# Patient Record
Sex: FEMALE | Race: BLACK OR AFRICAN AMERICAN | NOT HISPANIC OR LATINO | Employment: UNEMPLOYED | URBAN - METROPOLITAN AREA
[De-identification: names, ages, dates, MRNs, and addresses within clinical notes are randomized per-mention and may not be internally consistent; named-entity substitution may affect disease eponyms.]

---

## 2017-01-23 ENCOUNTER — HOSPITAL ENCOUNTER (EMERGENCY)
Facility: HOSPITAL | Age: 22
Discharge: HOME | End: 2017-01-23
Attending: EMERGENCY MEDICINE
Payer: COMMERCIAL

## 2017-01-23 VITALS
OXYGEN SATURATION: 100 % | WEIGHT: 114 LBS | RESPIRATION RATE: 17 BRPM | HEART RATE: 86 BPM | TEMPERATURE: 97.9 F | DIASTOLIC BLOOD PRESSURE: 65 MMHG | SYSTOLIC BLOOD PRESSURE: 110 MMHG

## 2017-01-23 DIAGNOSIS — R11.2 NAUSEA & VOMITING: ICD-10-CM

## 2017-01-23 DIAGNOSIS — Z3A.01 LESS THAN 8 WEEKS GESTATION OF PREGNANCY: Primary | ICD-10-CM

## 2017-01-23 LAB
ALBUMIN SERPL BCP-MCNC: 3.4 G/DL (ref 3.5–5)
ALP SERPL-CCNC: 59 U/L (ref 46–116)
ALT SERPL W P-5'-P-CCNC: 20 U/L (ref 12–78)
ANION GAP SERPL CALCULATED.3IONS-SCNC: 9 MMOL/L (ref 4–13)
AST SERPL W P-5'-P-CCNC: 15 U/L (ref 5–45)
BACTERIA UR QL AUTO: ABNORMAL /HPF
BASOPHILS # BLD AUTO: 0.01 THOUSANDS/ΜL (ref 0–0.1)
BASOPHILS NFR BLD AUTO: 0 % (ref 0–1)
BILIRUB SERPL-MCNC: 0.4 MG/DL (ref 0.2–1)
BILIRUB UR QL STRIP: NEGATIVE
BUN SERPL-MCNC: 6 MG/DL (ref 5–25)
CALCIUM SERPL-MCNC: 8.7 MG/DL (ref 8.3–10.1)
CHLORIDE SERPL-SCNC: 103 MMOL/L (ref 100–108)
CLARITY UR: CLEAR
CO2 SERPL-SCNC: 24 MMOL/L (ref 21–32)
COLOR UR: YELLOW
CREAT SERPL-MCNC: 0.56 MG/DL (ref 0.6–1.3)
EOSINOPHIL # BLD AUTO: 0.01 THOUSAND/ΜL (ref 0–0.61)
EOSINOPHIL NFR BLD AUTO: 0 % (ref 0–6)
ERYTHROCYTE [DISTWIDTH] IN BLOOD BY AUTOMATED COUNT: 13.3 % (ref 11.6–15.1)
GFR SERPL CREATININE-BSD FRML MDRD: >60 ML/MIN/1.73SQ M
GLUCOSE SERPL-MCNC: 85 MG/DL (ref 65–140)
GLUCOSE UR STRIP-MCNC: NEGATIVE MG/DL
HCG UR QL: POSITIVE
HCT VFR BLD AUTO: 34.5 % (ref 34.8–46.1)
HGB BLD-MCNC: 11.5 G/DL (ref 11.5–15.4)
HGB UR QL STRIP.AUTO: NEGATIVE
KETONES UR STRIP-MCNC: NEGATIVE MG/DL
LEUKOCYTE ESTERASE UR QL STRIP: ABNORMAL
LIPASE SERPL-CCNC: 76 U/L (ref 73–393)
LYMPHOCYTES # BLD AUTO: 0.59 THOUSANDS/ΜL (ref 0.6–4.47)
LYMPHOCYTES NFR BLD AUTO: 10 % (ref 14–44)
MAGNESIUM SERPL-MCNC: 1.7 MG/DL (ref 1.6–2.6)
MCH RBC QN AUTO: 26.8 PG (ref 26.8–34.3)
MCHC RBC AUTO-ENTMCNC: 33.3 G/DL (ref 31.4–37.4)
MCV RBC AUTO: 80 FL (ref 82–98)
MONOCYTES # BLD AUTO: 0.47 THOUSAND/ΜL (ref 0.17–1.22)
MONOCYTES NFR BLD AUTO: 8 % (ref 4–12)
MUCOUS THREADS UR QL AUTO: ABNORMAL
NEUTROPHILS # BLD AUTO: 4.9 THOUSANDS/ΜL (ref 1.85–7.62)
NEUTS SEG NFR BLD AUTO: 82 % (ref 43–75)
NITRITE UR QL STRIP: NEGATIVE
NON-SQ EPI CELLS URNS QL MICRO: ABNORMAL /HPF
PH UR STRIP.AUTO: 8.5 [PH] (ref 4.5–8)
PLATELET # BLD AUTO: 192 THOUSANDS/UL (ref 149–390)
PMV BLD AUTO: 11.1 FL (ref 8.9–12.7)
POTASSIUM SERPL-SCNC: 3.6 MMOL/L (ref 3.5–5.3)
PROT SERPL-MCNC: 7.5 G/DL (ref 6.4–8.2)
PROT UR STRIP-MCNC: ABNORMAL MG/DL
RBC # BLD AUTO: 4.29 MILLION/UL (ref 3.81–5.12)
RBC #/AREA URNS AUTO: ABNORMAL /HPF
SODIUM SERPL-SCNC: 136 MMOL/L (ref 136–145)
SP GR UR STRIP.AUTO: 1.02 (ref 1–1.03)
UROBILINOGEN UR QL STRIP.AUTO: 2 E.U./DL
WBC # BLD AUTO: 5.98 THOUSAND/UL (ref 4.31–10.16)
WBC #/AREA URNS AUTO: ABNORMAL /HPF

## 2017-01-23 PROCEDURE — 81025 URINE PREGNANCY TEST: CPT | Performed by: EMERGENCY MEDICINE

## 2017-01-23 PROCEDURE — 96374 THER/PROPH/DIAG INJ IV PUSH: CPT

## 2017-01-23 PROCEDURE — 80053 COMPREHEN METABOLIC PANEL: CPT | Performed by: EMERGENCY MEDICINE

## 2017-01-23 PROCEDURE — 96361 HYDRATE IV INFUSION ADD-ON: CPT

## 2017-01-23 PROCEDURE — 36415 COLL VENOUS BLD VENIPUNCTURE: CPT | Performed by: EMERGENCY MEDICINE

## 2017-01-23 PROCEDURE — 99283 EMERGENCY DEPT VISIT LOW MDM: CPT

## 2017-01-23 PROCEDURE — 83735 ASSAY OF MAGNESIUM: CPT | Performed by: EMERGENCY MEDICINE

## 2017-01-23 PROCEDURE — 87186 SC STD MICRODIL/AGAR DIL: CPT

## 2017-01-23 PROCEDURE — 87086 URINE CULTURE/COLONY COUNT: CPT

## 2017-01-23 PROCEDURE — 85025 COMPLETE CBC W/AUTO DIFF WBC: CPT | Performed by: EMERGENCY MEDICINE

## 2017-01-23 PROCEDURE — 81001 URINALYSIS AUTO W/SCOPE: CPT

## 2017-01-23 PROCEDURE — 87147 CULTURE TYPE IMMUNOLOGIC: CPT

## 2017-01-23 PROCEDURE — 83690 ASSAY OF LIPASE: CPT | Performed by: EMERGENCY MEDICINE

## 2017-01-23 PROCEDURE — 81002 URINALYSIS NONAUTO W/O SCOPE: CPT | Performed by: EMERGENCY MEDICINE

## 2017-01-23 RX ORDER — ONDANSETRON 2 MG/ML
4 INJECTION INTRAMUSCULAR; INTRAVENOUS ONCE
Status: COMPLETED | OUTPATIENT
Start: 2017-01-23 | End: 2017-01-23

## 2017-01-23 RX ORDER — FAMOTIDINE 20 MG
1 TABLET ORAL DAILY
Qty: 30 EACH | Refills: 0 | Status: SHIPPED | OUTPATIENT
Start: 2017-01-23 | End: 2020-03-01

## 2017-01-23 RX ORDER — ONDANSETRON 4 MG/1
4 TABLET, ORALLY DISINTEGRATING ORAL EVERY 8 HOURS PRN
Qty: 20 TABLET | Refills: 0 | Status: SHIPPED | OUTPATIENT
Start: 2017-01-23 | End: 2020-03-01

## 2017-01-23 RX ADMIN — ONDANSETRON 4 MG: 2 INJECTION INTRAMUSCULAR; INTRAVENOUS at 15:13

## 2017-01-23 RX ADMIN — SODIUM CHLORIDE 1000 ML: 0.9 INJECTION, SOLUTION INTRAVENOUS at 15:13

## 2017-01-26 ENCOUNTER — TELEPHONE (OUTPATIENT)
Dept: EMERGENCY DEPT | Facility: HOSPITAL | Age: 22
End: 2017-01-26

## 2017-01-26 LAB
BACTERIA UR CULT: NORMAL
BACTERIA UR CULT: NORMAL

## 2017-01-26 RX ORDER — CEPHALEXIN 500 MG/1
500 CAPSULE ORAL 3 TIMES DAILY
Qty: 9 CAPSULE | Refills: 0 | Status: SHIPPED | OUTPATIENT
Start: 2017-01-26 | End: 2017-01-29

## 2017-07-12 ENCOUNTER — GENERIC CONVERSION - ENCOUNTER (OUTPATIENT)
Dept: OTHER | Facility: OTHER | Age: 22
End: 2017-07-12

## 2017-07-13 ENCOUNTER — GENERIC CONVERSION - ENCOUNTER (OUTPATIENT)
Dept: OTHER | Facility: OTHER | Age: 22
End: 2017-07-13

## 2017-07-13 ENCOUNTER — ALLSCRIPTS OFFICE VISIT (OUTPATIENT)
Dept: PERINATAL CARE | Facility: CLINIC | Age: 22
End: 2017-07-13

## 2017-07-13 PROCEDURE — 76811 OB US DETAILED SNGL FETUS: CPT | Performed by: OBSTETRICS & GYNECOLOGY

## 2017-07-24 ENCOUNTER — GENERIC CONVERSION - ENCOUNTER (OUTPATIENT)
Dept: OTHER | Facility: OTHER | Age: 22
End: 2017-07-24

## 2017-08-23 ENCOUNTER — GENERIC CONVERSION - ENCOUNTER (OUTPATIENT)
Dept: OTHER | Facility: OTHER | Age: 22
End: 2017-08-23

## 2017-08-23 ENCOUNTER — ALLSCRIPTS OFFICE VISIT (OUTPATIENT)
Dept: PERINATAL CARE | Facility: OTHER | Age: 22
End: 2017-08-23

## 2017-08-23 PROCEDURE — 76816 OB US FOLLOW-UP PER FETUS: CPT | Performed by: OBSTETRICS & GYNECOLOGY

## 2018-01-12 VITALS
SYSTOLIC BLOOD PRESSURE: 131 MMHG | HEIGHT: 62 IN | DIASTOLIC BLOOD PRESSURE: 71 MMHG | BODY MASS INDEX: 23.37 KG/M2 | WEIGHT: 127 LBS

## 2018-01-16 NOTE — PROGRESS NOTES
AUG 23 2017         RE: Sudha Desai                             To: ShilpaQUINTON Carvajal    MR#: [id number]                                   [address]   : [address], [address]   SS#:                                              Fax: [phone]   ENC: [id number]   (Exam #: [id number])      The LMP of this 22year old,  G1, P0-0-0-0 patient was unknown, her   working KAILEY is AUG 27 2017 and the current gestational age is 43 weeks 0   days by 2rd Trimester Sono  A sonographic examination was performed on AUG   23 2017 using real time equipment  The ultrasound examination was   performed using abdominal technique  The patient has a BMI of 24 0  Her   blood pressure today was 119/77  Earliest ultrasound found in her record: MFM 17  33w1d  KAILEY 17      Cardiac motion was observed at 138 bpm       INDICATIONS      unsure of dates   late prenatal care   fetal growth   Evaluate missed anatomy      Exam Types      Level I      RESULTS      Fetus # 1 of 1   Vertex presentation   Fetal growth appeared normal   Placenta Location = Posterior   No placenta previa   Placenta Grade = II      MEASUREMENTS (* Included In Average GA)      AC              32 7 cm        36 weeks 6 days* (22%)   BPD              8 9 cm        36 weeks 0 days* (20%)   HC              32 9 cm        36 weeks 6 days* (21%)   Femur            7 2 cm        36 weeks 1 day * (24%)      HC/AC           1 01   FL/AC           0 22   FL/BPD          0 80   EFW (Ac/Fl/Hc)  3011 grams - 6 lbs 10 oz                 (21%)      THE AVERAGE GESTATIONAL AGE is 36 weeks 4 days +/- 21 days        AMNIOTIC FLUID      Q1: 3 0      Q2: 4 8      Q3: 3 8      Q4: 1 8   YVONNE Total = 13 4 cm   Amniotic Fluid: Normal      ANATOMY DETAILS      Visualized Appearing Sonographically Normal:   HEAD: (Calvarium, BPD Level, Cavum, Lateral Ventricles, Choroid Plexus);      TH  CAV : (Lungs, Diaphragm); HEART: (Proximal Left Outflow, Proximal   Right Outflow, 3VV, Cardiac Axis, Cardiac Position);    STOMACH, RIGHT   KIDNEY, LEFT KIDNEY, BLADDER, EXTREMS: (Rt Humerus, Lt Low Leg, Lt Foot,   Rt Foot); PLACENTA      Not Visualized:   HEAD: (Cerebellum, Cisterna Magna); HEART: (Four Chamber View);      EXTREMS: (Lt Forearm, Rt Low Leg); PCI      ANATOMY COMMENTS      The prior fetal anatomic survey was limited the area of the LT FOREARM, LT   AND RT ANKLE, FEET, LT HUMERUS AND PCI  Views of the LT ANKLE, RT AND   LT FOOT, LT HUMERUS were seen today as sonographically normal within the   inherent limitations of fetal ultrasound; however, views of the LT   FOREARM, RT ANKLE AND PCI were still limited by fetal position  IMPRESSION      Yousif IUP   36 weeks and 4 days by this ultrasound  (KAILEY=SEP 16 2017)   39 weeks and 0 days by 3rd Trimester Sono  (KAILEY=AUG 30 2017)   Vertex presentation   Fetal growth appeared normal   Regular fetal heart rate of 138 bpm   Posterior placenta   No placenta previa      GENERAL COMMENT      On exam today the patient appears well, in no acute distress, and denies   any complaints  Her abdomen is non-tender  There has been appropriate interval fetal growth  Good fetal movement and   tone are seen  The amniotic fluid volume appears normal   The placenta is   posterior and it appears sonographically normal   The anatomic structures   listed above could not be optimally imaged today because of fetal position   and late gestational age  The patient was informed of today's findings   and all of her questions were answered  The limitations of ultrasound   were reviewed with the patient  Labor precautions as well as fetal kick   counts were reviewed  Recommend further ultrasounds as clinically indicated  Thank you very much for allowing us to participate in the care of this   very nice patient    Should you have any questions, please do not hesitate   to contact our office  Please note, in addition to the time spent discussing the results of the   ultrasound, I spent approximately 10 minutes of face-to-face time with the   patient, greater than 50% of which was spent in counseling and the   coordination of care for this patient  SAMIR Flores M D     Electronically signed 08/23/17 10:42

## 2018-01-17 NOTE — PROGRESS NOTES
2017         RE: Maggie Montano                             To: QUINTON Stacy    MR#: [id number]                                   [address] [place] [address] Drive   : [address] [facility],  [address]   Maylin Clemons: [id number]                             Fax: [phone]   (Exam #: [id number])      The LMP of this 22year old,  G1, P0-0-0-0 patient was unknown, her   working KAILEY is AUG 27 2017 and the current gestational age is 29 weeks 1   day by 2rd Trimester Sono  A sonographic examination was performed on 2017 using real time equipment  The ultrasound examination was   performed using abdominal technique  The patient has a BMI of 23 2  Her   blood pressure today was 131/71  Earliest ultrasound found in her record: MFM 17  33w1d  KAILEY 17   Monique Mullins is on prenatal vitamins and denies any allergies to medication or   any use of cigarettes, alcohol or illicit drugs  She denies any past   medical history  Surgical history includes removal of an ovarian cyst    This is her first pregnancy and she is unsure of her last menstrual   period  She is late prenatal care  She denies any first generation family   history of hypertension, diabetes, thrombosis or congenital anomalies  She   missed the timing of genetic screening  She was here today with her social   worker        Cardiac motion was observed at 150 bpm       INDICATIONS      unsure of dates   late prenatal care      Exam Types      LEVEL II      RESULTS      Fetus # 1 of 1   Vertex presentation   Fetal growth appeared normal   Placenta Location = Posterior   No placenta previa   Placenta Grade = I      MEASUREMENTS (* Included In Average GA)      AC              29 9 cm        34 weeks 0 days* (63%)   BPD              8 0 cm        32 weeks 1 day * (28%)   HC              31 3 cm        34 weeks 4 days* (61%)   Femur            6 2 cm        32 weeks 1 day * (37%)      NBL 9 6 mm      Humerus          5 5 cm        31 weeks 5 days  (36%)      Cerebellum       4 2 cm        34 weeks 4 days   Biorbit          5 2 cm        32 weeks 1 day   CisternaMagna    8 1 mm      HC/AC           1 05   FL/AC           0 21   FL/BPD          0 78   EFW (Ac/Fl/Hc)  2225 grams - 4 lbs 14 oz                 (52%)      THE AVERAGE GESTATIONAL AGE is 33 weeks 1 day +/- 18 days  AMNIOTIC FLUID      Q1: 2 8      Q2: 1 5      Q3: 2 8      Q4: 4 2   YVONNE Total = 11 3 cm   Amniotic Fluid: Normal      ANATOMY      Head                                    Normal   Face/Neck                               Normal   Th  Cav  Normal   Heart                                   Normal   Abd  Cav  Normal   Stomach                                 Normal   Right Kidney                            Normal   Left Kidney                             Normal   Bladder                                 Normal   Abd  Wall                               Normal   Spine                                   Normal   Extrems                                 See Details   Genitalia                               Normal   Placenta                                Normal   Umbl  Cord                              Normal   Uterus                                  Normal   PCI                                     Not Visualized      ANATOMY DETAILS      Visualized Appearing Sonographically Normal:   HEAD: (Calvarium, BPD Level, Cavum, Lateral Ventricles, Choroid Plexus,   Cerebellum, Cisterna Magna);    FACE/NECK: (Profile, Orbits, Nose/Lips,   Palate, Face);    TH  CAV  : (Lungs, Diaphragm);     HEART: (Four Chamber   View, Proximal Left Outflow, Proximal Right Outflow, 3VV, 3 Vessel   Trachea, Short Axis of Greater Vessels, Ductal Arch, Aortic Arch,   Interventricular Septum, Interatrial Septum, IVC, SVC, Cardiac Axis,   Cardiac Position);    ABD  CAV : (Liver);    STOMACH, RIGHT KIDNEY, LEFT   KIDNEY, BLADDER, ABD  WALL, SPINE: (Cervical Spine, Thoracic Spine, Lumbar   Spine, Sacrum);    EXTREMS: (Rt Humerus, Rt Forearm, Lt Hand, Rt Hand, Lt   Femur, Rt Femur);    GENITALIA (Male), PLACENTA, UMBL  CORD, UTERUS      Suboptimally Visualized:   EXTREMS: (Lt Forearm, Lt Low Leg, Rt Low Leg, Lt Foot, Rt Foot)      Not Visualized:   EXTREMS: (Lt Humerus);    PCI      ANATOMY COMMENTS      Her survey of the fetal anatomy is not complete  No fetal structural abnormality or ultrasound marker for aneuploidy is   identified on the Level II ultrasound study today  The missed or limited   views above are secondary to her skin thickness, fetal position, late   gestational age  Fetal growth and amniotic fluid volume appear normal     Active movement of the fetal body & extremities was seen  There is no   suspicion of a subchorionic bleed  The placental cord insertion was not   visualized due to fetal position  ADNEXA      The left ovary appeared normal and measured 4 8 x 3 5 x 2 5 cm with a   volume of 22 0 cc  The right ovary appeared normal and measured 4 2 x 3 4   x 2 4 cm with a volume of 17 9 cc  BIOPHYSICAL PROFILE      The Biophysical Profile score was 8/8  Breathin  Movement: 2  Tone: 2  AFV: 2      IMPRESSION      Yousif IUP   33 weeks and 1 day by this ultrasound  (KAILEY=AUG 30 2017)   33 weeks and 1 day by 3rd Trimester Sono  (KAILEY=AUG 30 2017)   Vertex presentation   Fetal growth appeared normal   Regular fetal heart rate of 150 bpm   Posterior placenta   No placenta previa      GENERAL COMMENT      Recommend a four-week ultrasound to prove there is normal interval growth  Would start twice weekly nst and weekly chris at 40 weeks and would avoid an   elective induction till 41 weeks since a dating scan in the third   trimester can be off by plus or -21 days  SAMIR Paulino M D     Maternal-Fetal Medicine   Electronically signed 17 19:31

## 2020-03-01 ENCOUNTER — HOSPITAL ENCOUNTER (EMERGENCY)
Facility: HOSPITAL | Age: 25
Discharge: HOME | End: 2020-03-01
Attending: EMERGENCY MEDICINE | Admitting: EMERGENCY MEDICINE
Payer: COMMERCIAL

## 2020-03-01 VITALS
WEIGHT: 122.8 LBS | SYSTOLIC BLOOD PRESSURE: 123 MMHG | TEMPERATURE: 97.3 F | BODY MASS INDEX: 22.46 KG/M2 | OXYGEN SATURATION: 100 % | DIASTOLIC BLOOD PRESSURE: 64 MMHG | HEART RATE: 80 BPM | RESPIRATION RATE: 16 BRPM

## 2020-03-01 DIAGNOSIS — K52.9 GASTROENTERITIS: Primary | ICD-10-CM

## 2020-03-01 DIAGNOSIS — A59.9 TRICHOMONAS INFECTION: ICD-10-CM

## 2020-03-01 LAB
ANION GAP SERPL CALCULATED.3IONS-SCNC: 8 MMOL/L (ref 4–13)
BACTERIA UR QL AUTO: ABNORMAL /HPF
BILIRUB UR QL STRIP: NEGATIVE
BUN SERPL-MCNC: 8 MG/DL (ref 5–25)
CALCIUM SERPL-MCNC: 8.9 MG/DL (ref 8.3–10.1)
CHLORIDE SERPL-SCNC: 102 MMOL/L (ref 100–108)
CLARITY UR: CLEAR
CO2 SERPL-SCNC: 28 MMOL/L (ref 21–32)
COLOR UR: YELLOW
CREAT SERPL-MCNC: 0.82 MG/DL (ref 0.6–1.3)
EXT PREG TEST URINE: NEGATIVE
EXT. CONTROL ED NAV: NORMAL
GFR SERPL CREATININE-BSD FRML MDRD: 117 ML/MIN/1.73SQ M
GLUCOSE SERPL-MCNC: 96 MG/DL (ref 65–140)
GLUCOSE UR STRIP-MCNC: NEGATIVE MG/DL
HGB UR QL STRIP.AUTO: ABNORMAL
KETONES UR STRIP-MCNC: NEGATIVE MG/DL
LEUKOCYTE ESTERASE UR QL STRIP: ABNORMAL
NITRITE UR QL STRIP: NEGATIVE
NON-SQ EPI CELLS URNS QL MICRO: ABNORMAL /HPF
OTHER STN SPEC: ABNORMAL
PH UR STRIP.AUTO: 6 [PH] (ref 4.5–8)
POTASSIUM SERPL-SCNC: 3.5 MMOL/L (ref 3.5–5.3)
PROT UR STRIP-MCNC: NEGATIVE MG/DL
RBC #/AREA URNS AUTO: ABNORMAL /HPF
SODIUM SERPL-SCNC: 138 MMOL/L (ref 136–145)
SP GR UR STRIP.AUTO: 1.02 (ref 1–1.03)
UROBILINOGEN UR QL STRIP.AUTO: 0.2 E.U./DL
WBC #/AREA URNS AUTO: ABNORMAL /HPF

## 2020-03-01 PROCEDURE — 99283 EMERGENCY DEPT VISIT LOW MDM: CPT

## 2020-03-01 PROCEDURE — 99284 EMERGENCY DEPT VISIT MOD MDM: CPT | Performed by: EMERGENCY MEDICINE

## 2020-03-01 PROCEDURE — 96361 HYDRATE IV INFUSION ADD-ON: CPT

## 2020-03-01 PROCEDURE — 80048 BASIC METABOLIC PNL TOTAL CA: CPT | Performed by: PHYSICIAN ASSISTANT

## 2020-03-01 PROCEDURE — 36415 COLL VENOUS BLD VENIPUNCTURE: CPT | Performed by: PHYSICIAN ASSISTANT

## 2020-03-01 PROCEDURE — 87491 CHLMYD TRACH DNA AMP PROBE: CPT | Performed by: PHYSICIAN ASSISTANT

## 2020-03-01 PROCEDURE — 81025 URINE PREGNANCY TEST: CPT | Performed by: PHYSICIAN ASSISTANT

## 2020-03-01 PROCEDURE — 96374 THER/PROPH/DIAG INJ IV PUSH: CPT

## 2020-03-01 PROCEDURE — 87591 N.GONORRHOEAE DNA AMP PROB: CPT | Performed by: PHYSICIAN ASSISTANT

## 2020-03-01 PROCEDURE — 81001 URINALYSIS AUTO W/SCOPE: CPT

## 2020-03-01 RX ORDER — DICYCLOMINE HCL 20 MG
20 TABLET ORAL 2 TIMES DAILY
Qty: 8 TABLET | Refills: 0 | Status: SHIPPED | OUTPATIENT
Start: 2020-03-01 | End: 2020-08-03 | Stop reason: ALTCHOICE

## 2020-03-01 RX ORDER — METRONIDAZOLE 500 MG/1
500 TABLET ORAL EVERY 12 HOURS SCHEDULED
Qty: 14 TABLET | Refills: 0 | Status: SHIPPED | OUTPATIENT
Start: 2020-03-01 | End: 2020-03-08

## 2020-03-01 RX ORDER — DICYCLOMINE HCL 20 MG
20 TABLET ORAL ONCE
Status: COMPLETED | OUTPATIENT
Start: 2020-03-01 | End: 2020-03-01

## 2020-03-01 RX ORDER — ONDANSETRON 4 MG/1
4 TABLET, FILM COATED ORAL EVERY 8 HOURS PRN
Qty: 3 TABLET | Refills: 0 | Status: SHIPPED | OUTPATIENT
Start: 2020-03-01 | End: 2020-08-03 | Stop reason: ALTCHOICE

## 2020-03-01 RX ORDER — ONDANSETRON 2 MG/ML
4 INJECTION INTRAMUSCULAR; INTRAVENOUS ONCE
Status: COMPLETED | OUTPATIENT
Start: 2020-03-01 | End: 2020-03-01

## 2020-03-01 RX ADMIN — ONDANSETRON 4 MG: 2 INJECTION INTRAMUSCULAR; INTRAVENOUS at 08:44

## 2020-03-01 RX ADMIN — DICYCLOMINE HYDROCHLORIDE 20 MG: 20 TABLET ORAL at 10:37

## 2020-03-01 RX ADMIN — SODIUM CHLORIDE 1000 ML: 0.9 INJECTION, SOLUTION INTRAVENOUS at 08:42

## 2020-03-01 NOTE — DISCHARGE INSTRUCTIONS
The culture taken in the emergency department today will take 2-3 days to result  We will call you with a positive result

## 2020-03-01 NOTE — ED PROVIDER NOTES
History  Chief Complaint   Patient presents with    Vomiting     Pt c/o vomiting and diarrhea since 0400  c/o abdominal pain  denies fever  22year old female with with no past medical history presents to the emergency department for abdominal pain for the last 0400 today  Patient states the pain awoke her from sleep periumbilically and radiates to her LLQ  The pain is relieved with diarrhea  Patient states she has had 6 episodes of watery diarrhea and 3 episodes NBNB vomiting  Patient states she ate some leftover chinese food last evening  Denies sick contacts, recent travel  She denies any recent antibiotic use and had a laparoscopic ovarian procedure as a teenager  Patient states her LMP was 2/7/2020  She has been in a monogamous relationship, but states her partner and her recently "took a long break "  She denies any dysuria, flank pain, vaginal discharge, urgency or frequency  History provided by:  Patient   used: No    Abdominal Pain   Pain location:  Periumbilical  Pain quality: sharp    Pain radiates to:  LLQ  Onset quality:  Sudden  Duration:  4 hours  Timing:  Constant  Progression:  Worsening  Context: awakening from sleep and eating    Relieved by:   Bowel activity  Worsened by:  Nothing  Ineffective treatments:  None tried  Associated symptoms: diarrhea, nausea and vomiting    Associated symptoms: no chest pain, no chills, no constipation, no cough, no dysuria, no fever, no hematemesis, no hematochezia, no hematuria, no melena, no shortness of breath, no sore throat, no vaginal bleeding and no vaginal discharge    Risk factors: has not had multiple surgeries and not pregnant        None       Past Medical History:   Diagnosis Date    Ovarian cyst        Past Surgical History:   Procedure Laterality Date    CYST REMOVAL      OVARIAN CYST REMOVAL         Family History   Problem Relation Age of Onset    Endometriosis Mother      I have reviewed and agree with the history as documented  E-Cigarette/Vaping     E-Cigarette/Vaping Substances     Social History     Tobacco Use    Smoking status: Never Smoker    Smokeless tobacco: Never Used   Substance Use Topics    Alcohol use: No    Drug use: No       Review of Systems   Constitutional: Negative for chills and fever  HENT: Negative for congestion and sore throat  Respiratory: Negative for cough and shortness of breath  Cardiovascular: Negative for chest pain  Gastrointestinal: Positive for abdominal pain, diarrhea, nausea and vomiting  Negative for constipation, hematemesis, hematochezia and melena  Genitourinary: Negative for decreased urine volume, dysuria, flank pain, frequency, hematuria, pelvic pain, vaginal bleeding and vaginal discharge  Skin: Negative for rash  Neurological: Negative for headaches  All other systems reviewed and are negative  Physical Exam  Physical Exam   Constitutional: She is oriented to person, place, and time  Vital signs are normal  She appears well-developed and well-nourished  Non-toxic appearance  No distress  HENT:   Head: Normocephalic and atraumatic  Right Ear: Hearing and external ear normal    Left Ear: Hearing and external ear normal    Nose: Nose normal    Mouth/Throat: Uvula is midline, oropharynx is clear and moist and mucous membranes are normal    Eyes: Conjunctivae are normal    Neck: Normal range of motion and full passive range of motion without pain  Neck supple  Cardiovascular: Normal rate, regular rhythm, S1 normal, S2 normal and normal heart sounds  Pulmonary/Chest: Effort normal and breath sounds normal  No respiratory distress  She has no decreased breath sounds  She has no wheezes  She has no rhonchi  She has no rales  Abdominal: Soft  Bowel sounds are normal  There is tenderness in the periumbilical area  There is no rigidity, no rebound, no guarding, no CVA tenderness and no tenderness at McBurney's point     Musculoskeletal: Moves all four limbs without difficulty, crepitus, swelling, or deformity  Neurological: She is alert and oriented to person, place, and time  Skin: Skin is warm and dry  Capillary refill takes less than 2 seconds  No rash noted  Nursing note and vitals reviewed  Vital Signs  ED Triage Vitals [03/01/20 0755]   Temperature Pulse Respirations Blood Pressure SpO2   (!) 97 3 °F (36 3 °C) 92 18 126/61 100 %      Temp Source Heart Rate Source Patient Position - Orthostatic VS BP Location FiO2 (%)   Temporal Monitor Sitting Right arm --      Pain Score       7           Vitals:    03/01/20 0755 03/01/20 1037   BP: 126/61 123/64   Pulse: 92 80   Patient Position - Orthostatic VS: Sitting Sitting         Visual Acuity      ED Medications  Medications   sodium chloride 0 9 % bolus 1,000 mL (0 mL Intravenous Stopped 3/1/20 1007)   ondansetron (ZOFRAN) injection 4 mg (4 mg Intravenous Given 3/1/20 0844)   dicyclomine (BENTYL) tablet 20 mg (20 mg Oral Given 3/1/20 1037)       Diagnostic Studies  Results Reviewed     Procedure Component Value Units Date/Time    Chlamydia/GC amplified DNA by PCR [[id number]] Collected:  03/01/20 1007    Lab Status:   In process Specimen:  Urine, Other Updated:  03/01/20 3395    Basic metabolic panel [[id number]] Collected:  03/01/20 0842    Lab Status:  Final result Specimen:  Blood from Arm, Right Updated:  03/01/20 0857     Sodium 138 mmol/L      Potassium 3 5 mmol/L      Chloride 102 mmol/L      CO2 28 mmol/L      ANION GAP 8 mmol/L      BUN 8 mg/dL      Creatinine 0 82 mg/dL      Glucose 96 mg/dL      Calcium 8 9 mg/dL      eGFR 117 ml/min/1 73sq m     Narrative:       [place] guidelines for Chronic Kidney Disease (CKD):     Stage 1 with normal or high GFR (GFR > 90 mL/min/1 73 square meters)    Stage 2 Mild CKD (GFR = 60-89 mL/min/1 73 square meters)    Stage 3A Moderate CKD (GFR = 45-59 mL/min/1 73 square meters)    Stage 3B Moderate CKD (GFR = 30-44 mL/min/1 73 square meters)    Stage 4 Severe CKD (GFR = 15-29 mL/min/1 73 square meters)    Stage 5 End Stage CKD (GFR <15 mL/min/1 73 square meters)  Note: GFR calculation is accurate only with a steady state creatinine    Urine Microscopic [[id number]]  (Abnormal) Collected:  03/01/20 0834    Lab Status:  Final result Specimen:  Urine, Clean Catch Updated:  03/01/20 0857     RBC, UA None Seen /hpf      WBC, UA 1-2 /hpf      Epithelial Cells Moderate /hpf      Bacteria, UA Moderate /hpf      OTHER OBSERVATIONS Trichomonas Organisms Present    POCT urinalysis dipstick [[id number]]  (Abnormal) Resulted:  03/01/20 0837    Lab Status:  Final result Specimen:  Urine Updated:  03/01/20 0837    POCT pregnancy, urine [[id number]]  (Normal) Resulted:  03/01/20 0836    Lab Status:  Final result Updated:  03/01/20 0837     EXT PREG TEST UR (Ref: Negative) negative     Control valid    Urine Macroscopic, POC [[id number]]  (Abnormal) Collected:  03/01/20 0834    Lab Status:  Final result Specimen:  Urine Updated:  03/01/20 0836     Color, UA Yellow     Clarity, UA Clear     pH, UA 6 0     Leukocytes, UA Trace     Nitrite, UA Negative     Protein, UA Negative mg/dl      Glucose, UA Negative mg/dl      Ketones, UA Negative mg/dl      Urobilinogen, UA 0 2 E U /dl      Bilirubin, UA Negative     Blood, UA Trace     Specific [place], UA 1 025    Narrative:       CLINITEK RESULT                 No orders to display              Procedures  Procedures         ED Course  ED Course as of Mar 01 1158   Sun Mar 01, 2020   0837 Leukocytes, UA(!): Trace   1421 Blood, UA(!): Trace   6243 PREGNANCY TEST URINE: negative   5491 Basic metabolic panel   4969 OTHER OBSERVATIONS: Trichomonas Organisms Present   0958 Discussed with patient trichomonas in urine; will treat with flagyl; will PO challenge and reevaluate; abdominal exam remains benign      1142 Patient states pain resolved; no additional emesis or diarrhea; keen on discharge  MDM  Number of Diagnoses or Management Options  Gastroenteritis:   Trichomonas infection:   Diagnosis management comments: 22year old female with with no past medical history presents to the emergency department for abdominal pain for the last 0400 today  Patient found to have trichomonas in urine, will treat with flagyl  Will send urine for G/C, discussed with patient that these will take 2 days to result and we would prescribe antibiotics at that time  Discussed risks/benefits/alternatives of CT scan at this time  I counseled patient that I believe with her chronicity of symptoms, benign exam, we would cause more harm with radiation of a CT scan at this time  She was counseled to return if the abdominal pain worsens, the diarrhea and vomiting does not resolve, or any new or worsening symptoms  After having reviewed the patient's history and examined the patient, I have considered the following differential diagnosis:  acute appendicitis, bowel obstruction, torsion and abscess  As the patient has a benign abdominal examination, without significant tenderness when palpating all 4 quadrants,  these urgent conditions can be reasonably excluded  I do not feel that further diagnostic studies are indicated at this time  I have counseled the patient this symptomatic care and home observation with follow-up with the primary care provider is all that I recommended this time, however should the patient's condition become worse they should be re-evaluated  After having reviewed the patient's history and examined the patient, I have considered the following differential diagnosis:  acute appendicitis, bowel obstruction, torsion and abscess  As the patient has a benign abdominal examination, without significant tenderness when palpating all 4 quadrants,  these urgent conditions can be reasonably excluded  I do not feel that further diagnostic studies are indicated at this time  Patient is well-hydrated, taking p o fluids and has no clinical signs of dehydration, therefore do not feel that intravenous fluids are indicated  I have counseled the patient this symptomatic care and home observation with follow-up with the primary care provider is all that I recommended this time, however should the patient's condition become worse they should be re-evaluated  The management plan was discussed in detail with the patient at bedside and all questions were answered  The prior to discharge, we provided both verbal and written instructions  We discussed with the patient the signs and symptoms for which to return to the emergency department  All questions were answered and patient was comfortable with the plan of care and discharged to home  Instructed the patient to follow up with the primary care provider and/or special as provided and their written instructions  The patient verbalized understanding of our discussion and plan of care, and agrees to return to the Emergency Department for concerns and progression of illness  Disposition  Final diagnoses:   Gastroenteritis   Trichomonas infection     Time reflects when diagnosis was documented in both MDM as applicable and the Disposition within this note     Time User Action Codes Description Comment    3/1/2020 10:01 AM Misael Marina Add [K52 9] Gastroenteritis     3/1/2020 10:01 AM Misael Marina Add [A59 9] Trichomonas infection       ED Disposition     ED Disposition Condition Date/Time Comment    Discharge Stable Sun Mar 1, 2020 10:00 AM Dajdany Barthelus discharge to home/self care              Follow-up Information     Follow up With Specialties Details Why Contact Info Additional Information    Joye Apley, MD Family Medicine Schedule an appointment as soon as possible for a visit in 3 days  [address]  [address] [facility] [id number] [place] [address] Emergency Department Emergency Medicine Go to  If symptoms worsen [facility] [address]  [phone] [place] ED, [address] [facility] [address]  , [place], [place], [address]          Patient's Medications   Discharge Prescriptions    DICYCLOMINE (BENTYL) 20 MG TABLET    Take 1 tablet (20 mg total) by mouth 2 (two) times a day for 4 days       Start Date: 3/1/2020  End Date: 3/5/2020       Order Dose: 20 mg       Quantity: 8 tablet    Refills: 0    METRONIDAZOLE (FLAGYL) 500 MG TABLET    Take 1 tablet (500 mg total) by mouth every 12 (twelve) hours for 7 days       Start Date: 3/1/2020  End Date: 3/8/2020       Order Dose: 500 mg       Quantity: 14 tablet    Refills: 0    ONDANSETRON (ZOFRAN) 4 MG TABLET    Take 1 tablet (4 mg total) by mouth every 8 (eight) hours as needed for nausea or vomiting for up to 1 day       Start Date: 3/1/2020  End Date: 3/2/2020       Order Dose: 4 mg       Quantity: 3 tablet    Refills: 0     No discharge procedures on file      PDMP Review     None          ED Provider  Electronically Signed by           Tatum Negrete PA-C  03/01/20 5909

## 2020-03-02 LAB
C TRACH DNA SPEC QL NAA+PROBE: NEGATIVE
N GONORRHOEA DNA SPEC QL NAA+PROBE: NEGATIVE

## 2020-08-03 ENCOUNTER — HOSPITAL ENCOUNTER (EMERGENCY)
Facility: HOSPITAL | Age: 25
Discharge: HOME | End: 2020-08-03
Attending: EMERGENCY MEDICINE | Admitting: EMERGENCY MEDICINE
Payer: COMMERCIAL

## 2020-08-03 VITALS
WEIGHT: 114 LBS | OXYGEN SATURATION: 100 % | TEMPERATURE: 98.5 F | RESPIRATION RATE: 18 BRPM | BODY MASS INDEX: 20.85 KG/M2 | DIASTOLIC BLOOD PRESSURE: 66 MMHG | SYSTOLIC BLOOD PRESSURE: 108 MMHG | HEART RATE: 88 BPM

## 2020-08-03 DIAGNOSIS — R55 NEAR SYNCOPE: Primary | ICD-10-CM

## 2020-08-03 DIAGNOSIS — Z86.59 HISTORY OF SUICIDAL IDEATION: ICD-10-CM

## 2020-08-03 DIAGNOSIS — E86.0 DEHYDRATION: ICD-10-CM

## 2020-08-03 LAB
ALBUMIN SERPL BCP-MCNC: 4.3 G/DL (ref 3–5.2)
ALP SERPL-CCNC: 80 U/L (ref 43–122)
ALT SERPL W P-5'-P-CCNC: 14 U/L (ref 9–52)
ANION GAP SERPL CALCULATED.3IONS-SCNC: 9 MMOL/L (ref 5–14)
AST SERPL W P-5'-P-CCNC: 17 U/L (ref 14–36)
ATRIAL RATE: 68 BPM
BASOPHILS # BLD AUTO: 0 THOUSANDS/ΜL (ref 0–0.1)
BASOPHILS NFR BLD AUTO: 1 % (ref 0–1)
BILIRUB SERPL-MCNC: 0.8 MG/DL
BUN SERPL-MCNC: 16 MG/DL (ref 5–25)
CALCIUM SERPL-MCNC: 9.5 MG/DL (ref 8.4–10.2)
CHLORIDE SERPL-SCNC: 103 MMOL/L (ref 97–108)
CO2 SERPL-SCNC: 26 MMOL/L (ref 22–30)
CREAT SERPL-MCNC: 1.01 MG/DL (ref 0.6–1.2)
EOSINOPHIL # BLD AUTO: 0 THOUSAND/ΜL (ref 0–0.4)
EOSINOPHIL NFR BLD AUTO: 0 % (ref 0–6)
ERYTHROCYTE [DISTWIDTH] IN BLOOD BY AUTOMATED COUNT: 13.3 %
EXT PREG TEST URINE: NEGATIVE
EXT. CONTROL ED NAV: NORMAL
GFR SERPL CREATININE-BSD FRML MDRD: 91 ML/MIN/1.73SQ M
GLUCOSE SERPL-MCNC: 94 MG/DL (ref 70–99)
HCT VFR BLD AUTO: 42.6 % (ref 36–46)
HGB BLD-MCNC: 14.2 G/DL (ref 12–16)
LYMPHOCYTES # BLD AUTO: 1.2 THOUSANDS/ΜL (ref 0.5–4)
LYMPHOCYTES NFR BLD AUTO: 24 % (ref 25–45)
MCH RBC QN AUTO: 27.7 PG (ref 26–34)
MCHC RBC AUTO-ENTMCNC: 33.2 G/DL (ref 31–36)
MCV RBC AUTO: 83 FL (ref 80–100)
MONOCYTES # BLD AUTO: 0.5 THOUSAND/ΜL (ref 0.2–0.9)
MONOCYTES NFR BLD AUTO: 9 % (ref 1–10)
NEUTROPHILS # BLD AUTO: 3.3 THOUSANDS/ΜL (ref 1.8–7.8)
NEUTS SEG NFR BLD AUTO: 66 % (ref 45–65)
P AXIS: 65 DEGREES
PLATELET # BLD AUTO: 186 THOUSANDS/UL (ref 150–450)
PMV BLD AUTO: 9.4 FL (ref 8.9–12.7)
POTASSIUM SERPL-SCNC: 4.7 MMOL/L (ref 3.6–5)
PR INTERVAL: 172 MS
PROT SERPL-MCNC: 7.9 G/DL (ref 5.9–8.4)
QRS AXIS: 59 DEGREES
QRSD INTERVAL: 78 MS
QT INTERVAL: 396 MS
QTC INTERVAL: 421 MS
RBC # BLD AUTO: 5.11 MILLION/UL (ref 4–5.2)
SODIUM SERPL-SCNC: 138 MMOL/L (ref 137–147)
T WAVE AXIS: 54 DEGREES
VENTRICULAR RATE: 68 BPM
WBC # BLD AUTO: 5 THOUSAND/UL (ref 4.5–11)

## 2020-08-03 PROCEDURE — 81025 URINE PREGNANCY TEST: CPT | Performed by: PHYSICIAN ASSISTANT

## 2020-08-03 PROCEDURE — 96360 HYDRATION IV INFUSION INIT: CPT

## 2020-08-03 PROCEDURE — 85025 COMPLETE CBC W/AUTO DIFF WBC: CPT | Performed by: PHYSICIAN ASSISTANT

## 2020-08-03 PROCEDURE — 93005 ELECTROCARDIOGRAM TRACING: CPT

## 2020-08-03 PROCEDURE — 99284 EMERGENCY DEPT VISIT MOD MDM: CPT

## 2020-08-03 PROCEDURE — 36415 COLL VENOUS BLD VENIPUNCTURE: CPT | Performed by: PHYSICIAN ASSISTANT

## 2020-08-03 PROCEDURE — 93010 ELECTROCARDIOGRAM REPORT: CPT

## 2020-08-03 PROCEDURE — 80053 COMPREHEN METABOLIC PANEL: CPT | Performed by: PHYSICIAN ASSISTANT

## 2020-08-03 PROCEDURE — 99282 EMERGENCY DEPT VISIT SF MDM: CPT | Performed by: PHYSICIAN ASSISTANT

## 2020-08-03 RX ADMIN — SODIUM CHLORIDE 1000 ML: 0.9 INJECTION, SOLUTION INTRAVENOUS at 19:09

## 2020-08-03 NOTE — Clinical Note
Rk Ribeiro was seen and treated in our emergency department on 8/3/2020  Diagnosis:     Jaxon Daniel  may return to work on return date  She may return on 08/05/2020  If you have any questions or concerns, please don't hesitate to call        Lilli Elizalde PA-C    ______________________________           _______________          _______________  Hospital Representative                              Date                                Time

## 2020-08-03 NOTE — ED NOTES
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 to a 3year old  Patient stated she just needs some guidance to find someone to talk to  Patient stated she is working but does not currently have insurance  Patient stated she is willing to self pay until she can obtain insurance again  Patient was provided with outpatient resources and explained the packet  Patient was provided with the [facility] phone number to follow up about applying for assistance for MH coverage  Patient also provided with a list of local support group phone numbers to contact and #[phone]  Patient denied suicidal ideations, homicidal ideations, and denied any thoughts of self harm  Patient is able to contract for safety and is comfortable following up on her own for outpatient services

## 2020-08-03 NOTE — ED PROVIDER NOTES
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 when suddenly felt also going to pass out  Patient did not have a syncopal episode, not fall to the ground, hit head, or sustain other injuries  Symptoms lasted for several minutes and were improved when she sat down for several minutes and drink water until EMS arrived  Upon arrival in the ED now patient reports her symptoms have significantly improved  Patient received 500 cc IV normal saline by EMS  Patient denies history of similar episode in the past   Patient reports that earlier today 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  And endorse that she has not been hydrating and eating well the past several days due to warm conditions at work and having to wear mass  Patient denies any other symptoms at this time to include headache, lightheadedness, dizziness, weakness, paresthesias, cough, shortness of breath, nausea, vomiting and abdominal complaints  Patient also denies any recent head trauma  They routine history taking did patient reports intermittent suicidal ideations  Patient reports she is a single mother 3year-old child and lives alone, and reports she intermittently has suicidal ideations  Patient denies any plan of suicide  Denies any active thoughts of suicide in the ED  Denies any thoughts to harm herself or her child  Child currently is in the custody with her parents  Patient reports that her suicidal ideations are improved when she is in a crowd or in a group, but a worsened when she is alone    Patient requested speak to ED crisis for information and resources  History provided by:  Patient   used: No    Syncope   Episode history: near syncopal episode  Most recent episode: Today  Duration:  10 minutes  Chronicity:  New  Context: dehydration    Ineffective treatments:  None tried  Associated symptoms: no anxiety, no chest pain, no diaphoresis, no difficulty breathing, no dizziness, no fever, no focal sensory loss, no focal weakness, no headaches, no malaise/fatigue, no nausea, no palpitations, no recent fall, no recent injury, no recent surgery, no seizures, no shortness of breath, no visual change, no vomiting and no weakness    Risk factors: no congenital heart disease, no coronary artery disease, no seizures and no vascular disease        None       Past Medical History:   Diagnosis Date    Gastroenteritis     Ovarian cyst     Trichomonas infection        Past Surgical History:   Procedure Laterality Date    OVARIAN CYST REMOVAL         Family History   Problem Relation Age of Onset    Endometriosis Mother      I have reviewed and agree with the history as documented  E-Cigarette/Vaping     E-Cigarette/Vaping Substances     Social History     Tobacco Use    Smoking status: Never Smoker    Smokeless tobacco: Never Used   Substance Use Topics    Alcohol use: Yes     Comment: socially    Drug use: No       Review of Systems   Constitutional: Negative for diaphoresis, fever and malaise/fatigue  HENT: Negative for congestion, rhinorrhea and trouble swallowing  Eyes: Negative for visual disturbance  Respiratory: Negative for cough and shortness of breath  Cardiovascular: Positive for syncope  Negative for chest pain and palpitations  Gastrointestinal: Negative for blood in stool, nausea and vomiting  Genitourinary: Negative for menstrual problem, vaginal bleeding and vaginal discharge  Musculoskeletal: Negative for back pain and neck pain  Skin: Negative for color change, pallor and wound     Neurological: Negative for dizziness, focal weakness, seizures, weakness and headaches  Physical Exam  Physical Exam  Vitals signs and nursing note reviewed  Constitutional:       General: She is not in acute distress  Appearance: Normal appearance  She is well-developed  She is not ill-appearing or diaphoretic  HENT:      Head: Normocephalic and atraumatic  No raccoon eyes, Correia's sign, abrasion, contusion, masses, left periorbital erythema or laceration  Right Ear: Tympanic membrane, ear canal and external ear normal       Left Ear: Tympanic membrane, ear canal and external ear normal       Nose: Nose normal  No congestion or rhinorrhea  Mouth/Throat:      Mouth: Mucous membranes are moist       Pharynx: No oropharyngeal exudate or posterior oropharyngeal erythema  Eyes:      General: No scleral icterus  Right eye: No discharge  Left eye: No discharge  Conjunctiva/sclera: Conjunctivae normal       Pupils: Pupils are equal, round, and reactive to light  Neck:      Musculoskeletal: Normal range of motion and neck supple  No neck rigidity or muscular tenderness  Trachea: No tracheal deviation  Cardiovascular:      Rate and Rhythm: Normal rate and regular rhythm  Pulses: Normal pulses  Heart sounds: Normal heart sounds  No murmur  Pulmonary:      Effort: Pulmonary effort is normal  No respiratory distress  Breath sounds: Normal breath sounds  No stridor  No wheezing, rhonchi or rales  Chest:      Chest wall: No tenderness  Abdominal:      General: Bowel sounds are normal  There is no distension  Palpations: Abdomen is soft  Tenderness: There is no abdominal tenderness  There is no guarding  Musculoskeletal: Normal range of motion  General: No deformity or signs of injury  Right lower leg: No edema  Left lower leg: No edema  Lymphadenopathy:      Cervical: No cervical adenopathy  Skin:     General: Skin is warm and dry  Capillary Refill: Capillary refill takes less than 2 seconds  Findings: No bruising, erythema or rash  Neurological:      Mental Status: She is alert and oriented to person, place, and time  Sensory: No sensory deficit  Motor: No weakness        Coordination: Coordination normal       Gait: Gait normal    Psychiatric:         Mood and Affect: Mood normal          Behavior: Behavior normal          Vital Signs  ED Triage Vitals [08/03/20 1840]   Temperature Pulse Respirations Blood Pressure SpO2   98 5 °F (36 9 °C) 84 16 104/82 99 %      Temp Source Heart Rate Source Patient Position - Orthostatic VS BP Location FiO2 (%)   Tympanic Monitor Lying Left arm --      Pain Score       --           Vitals:    08/03/20 1840   BP: 104/82   Pulse: 84   Patient Position - Orthostatic VS: Lying         Visual Acuity      ED Medications  Medications   sodium chloride 0 9 % bolus 1,000 mL (1,000 mL Intravenous New Bag 8/3/20 1909)       Diagnostic Studies  Results Reviewed     Procedure Component Value Units Date/Time    Comprehensive metabolic panel [[id number]]  (Normal) Collected:  08/03/20 1912    Lab Status:  Final result Specimen:  Blood from Arm, Left Updated:  08/03/20 1934     Sodium 138 mmol/L      Potassium 4 7 mmol/L      Chloride 103 mmol/L      CO2 26 mmol/L      ANION GAP 9 mmol/L      BUN 16 mg/dL      Creatinine 1 01 mg/dL      Glucose 94 mg/dL      Calcium 9 5 mg/dL      AST 17 U/L      ALT 14 U/L      Alkaline Phosphatase 80 U/L      Total Protein 7 9 g/dL      Albumin 4 3 g/dL      Total Bilirubin 0 80 mg/dL      eGFR 91 ml/min/1 73sq m     Narrative:       [place] guidelines for Chronic Kidney Disease (CKD):     Stage 1 with normal or high GFR (GFR > 90 mL/min/1 73 square meters)    Stage 2 Mild CKD (GFR = 60-89 mL/min/1 73 square meters)    Stage 3A Moderate CKD (GFR = 45-59 mL/min/1 73 square meters)    Stage 3B Moderate CKD (GFR = 30-44 mL/min/1 73 square meters)    Stage 4 Severe CKD (GFR = 15-29 mL/min/1 73 square meters)    Stage 5 End Stage CKD (GFR <15 mL/min/1 73 square meters)  Note: GFR calculation is accurate only with a steady state creatinine    CBC and differential [[id number]]  (Abnormal) Collected:  08/03/20 1912    Lab Status:  Final result Specimen:  Blood from Arm, Left Updated:  08/03/20 1927     WBC 5 00 Thousand/uL      RBC 5 11 Million/uL      Hemoglobin 14 2 g/dL      Hematocrit 42 6 %      MCV 83 fL      MCH 27 7 pg      MCHC 33 2 g/dL      RDW 13 3 %      MPV 9 4 fL      Platelets 065 Thousands/uL      Neutrophils Relative 66 %      Lymphocytes Relative 24 %      Monocytes Relative 9 %      Eosinophils Relative 0 %      Basophils Relative 1 %      Neutrophils Absolute 3 30 Thousands/µL      Lymphocytes Absolute 1 20 Thousands/µL      Monocytes Absolute 0 50 Thousand/µL      Eosinophils Absolute 0 00 Thousand/µL      Basophils Absolute 0 00 Thousands/µL     POCT pregnancy, urine [[id number]]  (Normal) Resulted:  08/03/20 1913    Lab Status:  Final result Updated:  08/03/20 1914     EXT PREG TEST UR (Ref: Negative) negative     Control valid                 No orders to display              Procedures  ECG 12 Lead Documentation Only    Date/Time: 8/3/2020 7:24 PM  Performed by: Jose Roberto Ferreira PA-C  Authorized by: Jose Roberto Ferreira PA-C     Indications / Diagnosis:  Near syncopal episode  ECG reviewed by me, the ED Provider: yes    Patient location:  ED  Previous ECG:     Previous ECG:  Unavailable  Interpretation:     Interpretation: normal    Rate:     ECG rate:  68    ECG rate assessment: normal    Rhythm:     Rhythm: sinus rhythm    Ectopy:     Ectopy: none    QRS:     QRS axis:  Normal    QRS intervals:  Normal  Conduction:     Conduction: normal    T waves:     T waves: normal    Other findings:     Other findings: LAE               ED Course       US AUDIT      Most Recent Value   Initial Alcohol Screen: US AUDIT-C    1   How often do you have a drink containing alcohol? 1 Filed at: 08/03/2020 1846   2  How many drinks containing alcohol do you have on a typical day you are drinking? 2 Filed at: 08/03/2020 1846   3a  Male UNDER 65: How often do you have five or more drinks on one occasion? 0 Filed at: 08/03/2020 1846   3b  FEMALE Any Age, or MALE 65+: How often do you have 4 or more drinks on one occassion? 0 Filed at: 08/03/2020 1846   Audit-C Score  3 Filed at: 08/03/2020 1846                  ROSEMARY/DAST-10      Most Recent Value   How many times in the past year have you    Used an illegal drug or used a prescription medication for non-medical reasons? Never Filed at: 08/03/2020 1846                                MDM  Number of Diagnoses or Management Options  Dehydration: new and requires workup  History of suicidal ideation: new and requires workup  Near syncope: new and requires workup  Diagnosis management comments: 41-year-old female presents to the ED for a near syncopal episode 20 minutes ago  Patient was presented to ED by EMS  She was shopping at a convenience store when she suddenly felt like "I was going to pass out"  Patient reports that her symptoms resolved when she sat down to drink some water  On examination patient is alert and oriented  No acute respiratory distress  Patient has no other physical complaints on evaluation  Labs were noncontributory  Patient received 1500 cc normal saline in total   Heart is routine history patient did endorse intermittent suicidal ideations "when I am alone"  Patient reports that when she is in a crowd around other she does not have suicidal ideations  Patient does not endorse a plan or has a history of suicide attempts  Patient is a mother of a 3year-old that is in the custody of her parents at this time  Patient is not endorse any thoughts of harming the child  Patient request to speak to crisis regarding outpatient resources         Amount and/or Complexity of Data Reviewed  Clinical lab tests: ordered and reviewed  Review and summarize past medical records: yes  Discuss the patient with other providers: yes  Independent visualization of images, tracings, or specimens: yes    Risk of Complications, Morbidity, and/or Mortality  Presenting problems: moderate  Diagnostic procedures: moderate  Management options: moderate    Patient Progress  Patient progress: resolved        Disposition  Final diagnoses:   Near syncope   Dehydration   History of suicidal ideation     Time reflects when diagnosis was documented in both MDM as applicable and the Disposition within this note     Time User Action Codes Description Comment    8/3/2020  7:44 PM Mina Gamboa [[id number]] Near syncope     8/3/2020  7:44 PM Anabelle Gonzalez [E86 [id number]] Dehydration     8/3/2020  7:44 PM Mina King Add [[id number]] History of suicidal ideation       ED Disposition     ED Disposition Condition Date/Time Comment    Discharge Stable Mon Aug 3, 2020  7:44 PM Analy Lopez discharge to home/self care  Follow-up Information     Follow up With Specialties Details Why [facility], [address] [place] [address]  [address] [facility]  [place] [address] [facility]  [phone]            Patient's Medications   Discharge Prescriptions    No medications on file     No discharge procedures on file      PDMP Review     None          ED Provider  Electronically Signed by           Araseli Newton PA-C  08/03/20 2013

## 2021-11-14 ENCOUNTER — HOSPITAL ENCOUNTER (EMERGENCY)
Facility: HOSPITAL | Age: 26
Discharge: HOME | End: 2021-11-14
Attending: EMERGENCY MEDICINE | Admitting: EMERGENCY MEDICINE
Payer: COMMERCIAL

## 2021-11-14 VITALS
HEART RATE: 79 BPM | OXYGEN SATURATION: 100 % | RESPIRATION RATE: 16 BRPM | SYSTOLIC BLOOD PRESSURE: 123 MMHG | BODY MASS INDEX: 25.77 KG/M2 | WEIGHT: 140.87 LBS | DIASTOLIC BLOOD PRESSURE: 71 MMHG | TEMPERATURE: 97.9 F

## 2021-11-14 DIAGNOSIS — S16.1XXA STRAIN OF NECK MUSCLE, INITIAL ENCOUNTER: Primary | ICD-10-CM

## 2021-11-14 LAB
BILIRUB UR QL STRIP: NEGATIVE
CLARITY UR: CLEAR
COLOR UR: YELLOW
EXT PREG TEST URINE: NORMAL
EXT. CONTROL ED NAV: NORMAL
GLUCOSE UR STRIP-MCNC: NEGATIVE MG/DL
HGB UR QL STRIP.AUTO: NEGATIVE
KETONES UR STRIP-MCNC: NEGATIVE MG/DL
LEUKOCYTE ESTERASE UR QL STRIP: NEGATIVE
NITRITE UR QL STRIP: NEGATIVE
PH UR STRIP.AUTO: 8.5 [PH] (ref 4.5–8)
PROT UR STRIP-MCNC: NEGATIVE MG/DL
SP GR UR STRIP.AUTO: 1.02 (ref 1–1.03)
UROBILINOGEN UR QL STRIP.AUTO: 0.2 E.U./DL

## 2021-11-14 PROCEDURE — 81025 URINE PREGNANCY TEST: CPT | Performed by: PHYSICIAN ASSISTANT

## 2021-11-14 PROCEDURE — 81003 URINALYSIS AUTO W/O SCOPE: CPT

## 2021-11-14 PROCEDURE — 99283 EMERGENCY DEPT VISIT LOW MDM: CPT

## 2021-11-14 PROCEDURE — 99284 EMERGENCY DEPT VISIT MOD MDM: CPT | Performed by: PHYSICIAN ASSISTANT

## 2021-11-14 PROCEDURE — 96372 THER/PROPH/DIAG INJ SC/IM: CPT

## 2021-11-14 RX ORDER — LIDOCAINE 40 MG/G
CREAM TOPICAL AS NEEDED
Qty: 30 G | Refills: 0 | Status: SHIPPED | OUTPATIENT
Start: 2021-11-14

## 2021-11-14 RX ORDER — KETOROLAC TROMETHAMINE 30 MG/ML
15 INJECTION, SOLUTION INTRAMUSCULAR; INTRAVENOUS ONCE
Status: COMPLETED | OUTPATIENT
Start: 2021-11-14 | End: 2021-11-14

## 2021-11-14 RX ORDER — LIDOCAINE 50 MG/G
1 PATCH TOPICAL ONCE
Status: DISCONTINUED | OUTPATIENT
Start: 2021-11-14 | End: 2021-11-14 | Stop reason: HOSPADM

## 2021-11-14 RX ORDER — METHOCARBAMOL 500 MG/1
500 TABLET, FILM COATED ORAL 4 TIMES DAILY
Qty: 40 TABLET | Refills: 0 | Status: SHIPPED | OUTPATIENT
Start: 2021-11-14 | End: 2021-11-24

## 2021-11-14 RX ORDER — NAPROXEN 500 MG/1
500 TABLET ORAL 2 TIMES DAILY WITH MEALS
Qty: 20 TABLET | Refills: 0 | Status: SHIPPED | OUTPATIENT
Start: 2021-11-14 | End: 2021-11-24

## 2021-11-14 RX ADMIN — KETOROLAC TROMETHAMINE 15 MG: 30 INJECTION, SOLUTION INTRAMUSCULAR; INTRAVENOUS at 11:45

## 2021-11-14 RX ADMIN — LIDOCAINE 1 PATCH: 50 PATCH CUTANEOUS at 11:44
